# Patient Record
Sex: MALE | Race: WHITE | NOT HISPANIC OR LATINO | URBAN - METROPOLITAN AREA
[De-identification: names, ages, dates, MRNs, and addresses within clinical notes are randomized per-mention and may not be internally consistent; named-entity substitution may affect disease eponyms.]

---

## 2019-01-29 ENCOUNTER — OFFICE VISIT (OUTPATIENT)
Dept: URBAN - METROPOLITAN AREA CLINIC 48 | Facility: CLINIC | Age: 84
End: 2019-01-29
Payer: MEDICARE

## 2019-01-29 DIAGNOSIS — H40.053 OCULAR HYPERTENSION, BILATERAL: Primary | ICD-10-CM

## 2019-01-29 PROCEDURE — 92012 INTRM OPH EXAM EST PATIENT: CPT | Performed by: OPHTHALMOLOGY

## 2019-01-29 ASSESSMENT — INTRAOCULAR PRESSURE
OD: 21
OS: 21

## 2019-01-29 NOTE — IMPRESSION/PLAN
Impression: Ocular hypertension, bilateral: H40.053. Plan: Pt to d/c Brimonidine on a trial basis. IOP stable at this time.

## 2019-01-29 NOTE — IMPRESSION/PLAN
Impression: Other secondary cataract, bilateral: H26.493. Plan: Patient is having Consult with Dr Gabby Knight today did not dilate on today's visit.

## 2019-04-01 ENCOUNTER — OFFICE VISIT (OUTPATIENT)
Dept: URBAN - METROPOLITAN AREA CLINIC 48 | Facility: CLINIC | Age: 84
End: 2019-04-01
Payer: MEDICARE

## 2019-04-01 DIAGNOSIS — H40.013 OPEN ANGLE WITH BORDERLINE FINDINGS, LOW RISK, BILATERAL: Primary | ICD-10-CM

## 2019-04-01 PROCEDURE — 92083 EXTENDED VISUAL FIELD XM: CPT | Performed by: OPHTHALMOLOGY

## 2019-04-09 ENCOUNTER — OFFICE VISIT (OUTPATIENT)
Dept: URBAN - METROPOLITAN AREA CLINIC 48 | Facility: CLINIC | Age: 84
End: 2019-04-09
Payer: MEDICARE

## 2019-04-09 PROCEDURE — 99214 OFFICE O/P EST MOD 30 MIN: CPT | Performed by: OPHTHALMOLOGY

## 2019-04-09 ASSESSMENT — INTRAOCULAR PRESSURE
OD: 18
OS: 16

## 2019-04-09 NOTE — IMPRESSION/PLAN
Impression: Diplopia: H53.2. Ortho today History of Meningoma Plan: double vision is stable at this time. Diplopia not worsening. Recommend to see Neurologist once a year and get MRI repeated if it was done a year ago. Refer to Lizz Le M.D. (patient going home) please fax today's notes and if there are any question may call Dr. Delmar Santos cell at [phone] RTC  Next fall with Dr. Diane Sawyer for Diplopia

## 2019-11-01 ENCOUNTER — OFFICE VISIT (OUTPATIENT)
Dept: URBAN - METROPOLITAN AREA CLINIC 48 | Facility: CLINIC | Age: 84
End: 2019-11-01
Payer: MEDICARE

## 2019-11-01 DIAGNOSIS — H26.493 OTHER SECONDARY CATARACT, BILATERAL: ICD-10-CM

## 2019-11-01 DIAGNOSIS — H53.2 DIPLOPIA: Primary | ICD-10-CM

## 2019-11-01 PROCEDURE — 92014 COMPRE OPH EXAM EST PT 1/>: CPT | Performed by: OPHTHALMOLOGY

## 2019-11-01 ASSESSMENT — INTRAOCULAR PRESSURE
OD: 21
OS: 18

## 2019-11-01 NOTE — IMPRESSION/PLAN
Impression: Diplopia: H53.2. Ortho today with glasses on in all fields of gaze. In primary gaze he has an ET flick History of Menangioma Plan: Since April, 1 time episode of diplopia that lasted less than 1 minute. Recommend non-urgent consult to see Neurologist [facility]) for transient diplopia and follow up on Meningioma history. RTC 12- 18 months. IF patient calls or Dr. Rufus Zabala wants us to see him sooner, add him on 1-2 days or 1-2 weeks depending on symptoms.

## 2020-11-04 ENCOUNTER — OFFICE VISIT (OUTPATIENT)
Dept: URBAN - METROPOLITAN AREA CLINIC 48 | Facility: CLINIC | Age: 85
End: 2020-11-04
Payer: MEDICARE

## 2020-11-04 PROCEDURE — 92014 COMPRE OPH EXAM EST PT 1/>: CPT | Performed by: OPHTHALMOLOGY

## 2020-11-04 ASSESSMENT — INTRAOCULAR PRESSURE
OD: 18
OS: 17

## 2020-11-04 NOTE — IMPRESSION/PLAN
Impression: Diplopia: H53.2. Ortho today with glasses on in all fields of gaze. In primary gaze he has an ET flick  with Prism in glasses
up gaze 1 ET History of Menangioma Plan: Double vision is about the same since last visit. Recommend patient to see Neurology to repeat MRI. Imaging ordered : MRI of the brain with and with out contrast

Labs ordered: BUN  and Creatinine RTC 3 weeks follow up with results  and check double vision with out prism glasses normal

## 2020-11-19 ENCOUNTER — OFFICE VISIT (OUTPATIENT)
Dept: URBAN - METROPOLITAN AREA CLINIC 48 | Facility: CLINIC | Age: 85
End: 2020-11-19
Payer: MEDICARE

## 2020-11-19 PROCEDURE — 92012 INTRM OPH EXAM EST PATIENT: CPT | Performed by: OPHTHALMOLOGY

## 2020-11-19 ASSESSMENT — INTRAOCULAR PRESSURE
OD: 18
OS: 17

## 2020-11-19 NOTE — IMPRESSION/PLAN
Impression: Diplopia: H53.2. Ortho today with glasses on in all fields of gaze. In primary gaze he has an ET flick  with Prism in glasses
up gaze 1 ET History of Menangioma Plan: MRI shows 1.8 mm meningioma stable anterior aspect. not increased in size. Patient has no double vision. IF PCP thinks patient needs to see Neurologist  then they may refer. 

RTC 1 year follow up  (long)

## 2020-12-17 ENCOUNTER — OFFICE VISIT (OUTPATIENT)
Dept: URBAN - METROPOLITAN AREA CLINIC 48 | Facility: CLINIC | Age: 85
End: 2020-12-17
Payer: MEDICARE

## 2020-12-17 PROCEDURE — 92012 INTRM OPH EXAM EST PATIENT: CPT | Performed by: OPHTHALMOLOGY

## 2020-12-17 ASSESSMENT — INTRAOCULAR PRESSURE
OS: 17
OD: 20

## 2020-12-17 NOTE — IMPRESSION/PLAN
Impression: Ocular hypertension, bilateral: H40.053. Plan: Optic nerve appears normal.
Continue to monitor at this time. Continue care with Dr. Sue Xiong.